# Patient Record
Sex: MALE | Race: WHITE | NOT HISPANIC OR LATINO
[De-identification: names, ages, dates, MRNs, and addresses within clinical notes are randomized per-mention and may not be internally consistent; named-entity substitution may affect disease eponyms.]

---

## 2020-09-29 ENCOUNTER — APPOINTMENT (OUTPATIENT)
Dept: GASTROENTEROLOGY | Facility: CLINIC | Age: 25
End: 2020-09-29
Payer: COMMERCIAL

## 2020-09-29 VITALS
TEMPERATURE: 96.6 F | OXYGEN SATURATION: 99 % | DIASTOLIC BLOOD PRESSURE: 68 MMHG | WEIGHT: 173 LBS | BODY MASS INDEX: 24.77 KG/M2 | HEIGHT: 70 IN | SYSTOLIC BLOOD PRESSURE: 112 MMHG | RESPIRATION RATE: 14 BRPM | HEART RATE: 55 BPM

## 2020-09-29 DIAGNOSIS — K92.1 MELENA: ICD-10-CM

## 2020-09-29 DIAGNOSIS — K92.2 GASTROINTESTINAL HEMORRHAGE, UNSPECIFIED: ICD-10-CM

## 2020-09-29 DIAGNOSIS — Z78.9 OTHER SPECIFIED HEALTH STATUS: ICD-10-CM

## 2020-09-29 DIAGNOSIS — Z80.0 FAMILY HISTORY OF MALIGNANT NEOPLASM OF DIGESTIVE ORGANS: ICD-10-CM

## 2020-09-29 PROCEDURE — 99204 OFFICE O/P NEW MOD 45 MIN: CPT

## 2020-09-29 NOTE — HISTORY OF PRESENT ILLNESS
[de-identified] : 25 year old male who has had rectal bleeding. The first episode was about a year ago and lasted one to two weeks. The second episode was about 1/2 year ago and lasted a few days. He is also currently having an episode. H notes blood dripping in the toilet. He denies any pain. He does have periods of  constipation but does not use any medications. His grandmother had colon cancer.

## 2020-09-29 NOTE — ASSESSMENT
[FreeTextEntry1] : Rectal bleeding. FH of colon cancer in grandmother. \par \par Plan: I gave the option of flex sig or colonoscopy. The pt. would prefer a colonoscopy.

## 2020-11-27 DIAGNOSIS — Z01.818 ENCOUNTER FOR OTHER PREPROCEDURAL EXAMINATION: ICD-10-CM

## 2020-11-29 ENCOUNTER — APPOINTMENT (OUTPATIENT)
Dept: DISASTER EMERGENCY | Facility: CLINIC | Age: 25
End: 2020-11-29

## 2020-11-30 ENCOUNTER — NON-APPOINTMENT (OUTPATIENT)
Age: 25
End: 2020-11-30

## 2020-11-30 LAB — SARS-COV-2 N GENE NPH QL NAA+PROBE: NOT DETECTED

## 2020-11-30 RX ORDER — SODIUM SULFATE, POTASSIUM SULFATE, MAGNESIUM SULFATE 17.5; 3.13; 1.6 G/ML; G/ML; G/ML
17.5-3.13-1.6 SOLUTION, CONCENTRATE ORAL
Qty: 1 | Refills: 0 | Status: ACTIVE | COMMUNITY
Start: 2020-11-30 | End: 1900-01-01

## 2020-12-02 ENCOUNTER — OUTPATIENT (OUTPATIENT)
Dept: OUTPATIENT SERVICES | Facility: HOSPITAL | Age: 25
LOS: 1 days | Discharge: HOME | End: 2020-12-02
Payer: COMMERCIAL

## 2020-12-02 ENCOUNTER — APPOINTMENT (OUTPATIENT)
Dept: GASTROENTEROLOGY | Facility: HOSPITAL | Age: 25
End: 2020-12-02

## 2020-12-02 VITALS
RESPIRATION RATE: 15 BRPM | SYSTOLIC BLOOD PRESSURE: 121 MMHG | HEIGHT: 71 IN | DIASTOLIC BLOOD PRESSURE: 69 MMHG | HEART RATE: 63 BPM | OXYGEN SATURATION: 99 % | WEIGHT: 177.91 LBS | TEMPERATURE: 98 F

## 2020-12-02 VITALS
RESPIRATION RATE: 15 BRPM | DIASTOLIC BLOOD PRESSURE: 78 MMHG | OXYGEN SATURATION: 99 % | HEART RATE: 86 BPM | SYSTOLIC BLOOD PRESSURE: 126 MMHG

## 2020-12-02 DIAGNOSIS — K92.2 GASTROINTESTINAL HEMORRHAGE, UNSPECIFIED: ICD-10-CM

## 2020-12-02 PROCEDURE — 45378 DIAGNOSTIC COLONOSCOPY: CPT | Mod: GC

## 2020-12-02 RX ORDER — SODIUM CHLORIDE 9 MG/ML
1000 INJECTION, SOLUTION INTRAVENOUS
Refills: 0 | Status: DISCONTINUED | OUTPATIENT
Start: 2020-12-02 | End: 2020-12-17

## 2020-12-08 ENCOUNTER — NON-APPOINTMENT (OUTPATIENT)
Age: 25
End: 2020-12-08

## 2025-09-15 ENCOUNTER — NON-APPOINTMENT (OUTPATIENT)
Age: 30
End: 2025-09-15